# Patient Record
Sex: FEMALE | Race: WHITE | NOT HISPANIC OR LATINO | URBAN - METROPOLITAN AREA
[De-identification: names, ages, dates, MRNs, and addresses within clinical notes are randomized per-mention and may not be internally consistent; named-entity substitution may affect disease eponyms.]

---

## 2017-10-20 ENCOUNTER — ANESTHESIA - HEALTHEAST (OUTPATIENT)
Dept: SURGERY | Facility: CLINIC | Age: 62
End: 2017-10-20

## 2017-10-20 ENCOUNTER — SURGERY - HEALTHEAST (OUTPATIENT)
Dept: SURGERY | Facility: CLINIC | Age: 62
End: 2017-10-20

## 2017-10-20 ASSESSMENT — MIFFLIN-ST. JEOR
SCORE: 1405.44
SCORE: 1369.15

## 2017-10-21 ASSESSMENT — MIFFLIN-ST. JEOR: SCORE: 1393.2

## 2017-10-22 ASSESSMENT — MIFFLIN-ST. JEOR: SCORE: 1392.74

## 2017-10-23 ENCOUNTER — HOME CARE/HOSPICE - HEALTHEAST (OUTPATIENT)
Dept: HOME HEALTH SERVICES | Facility: HOME HEALTH | Age: 62
End: 2017-10-23

## 2017-10-23 ASSESSMENT — MIFFLIN-ST. JEOR: SCORE: 1388.72

## 2021-05-25 ENCOUNTER — RECORDS - HEALTHEAST (OUTPATIENT)
Dept: ADMINISTRATIVE | Facility: CLINIC | Age: 66
End: 2021-05-25

## 2021-05-26 ENCOUNTER — RECORDS - HEALTHEAST (OUTPATIENT)
Dept: ADMINISTRATIVE | Facility: CLINIC | Age: 66
End: 2021-05-26

## 2021-05-31 VITALS — HEIGHT: 64 IN | BODY MASS INDEX: 32.32 KG/M2 | WEIGHT: 189.31 LBS

## 2021-06-01 ENCOUNTER — RECORDS - HEALTHEAST (OUTPATIENT)
Dept: ADMINISTRATIVE | Facility: CLINIC | Age: 66
End: 2021-06-01

## 2021-06-13 NOTE — ANESTHESIA CARE TRANSFER NOTE
Last vitals:   Vitals:    10/20/17 1404   BP: (!) 131/104   Pulse: (!) 108   Resp: 18   Temp: 36.9  C (98.4  F)   SpO2: 99%     Patient's level of consciousness is drowsy  Spontaneous respirations: yes  Maintains airway independently: yes  Dentition unchanged: yes  Oropharynx: oropharynx clear of all foreign objects    QCDR Measures:  ASA# 20 - Surgical Safety Checklist: WHO surgical safety checklist completed prior to induction  PQRS# 430 - Adult PONV Prevention: 4558F - Pt received => 2 anti-emetic agents (different classes) preop & intraop  ASA# 8 - Peds PONV Prevention: NA - Not pediatric patient, not GA or 2 or more risk factors NOT present  PQRS# 424 - Jayashree-op Temp Management: 4559F - At least one body temp DOCUMENTED => 35.5C or 95.9F within required timeframe  PQRS# 426 - PACU Transfer Protocol: - Transfer of care checklist used  ASA# 14 - Acute Post-op Pain: ASA14B - Patient did NOT experience pain >= 7 out of 10

## 2021-06-13 NOTE — ANESTHESIA PREPROCEDURE EVALUATION
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    SK         Neuro/Psych      Endo/Other    (+) obesity,      GI/Hepatic/Renal            Dental - normal exam                        Anesthesia Plan  Planned anesthetic: general endotracheal  ONE AW GLIMPSE WITH GLIDESCOPE, IF NO VIEW, AWAKEN AND INTUBATE  (2/2 NO TIME FOR OBTAINING RECORDS FROM REGIONS--DIFF AW)      Lab Results       Component                Value               Date                       INR                      3.82 (H)            10/20/2017            Lab Results       Component                Value               Date                       HGB                      12.7                10/20/2017            Lab Results       Component                Value               Date                       K                        4.1                 10/20/2017              ASA 4 - emergent   Induction: intravenous   Anesthetic plan and risks discussed with: patient  Anesthesia plan special considerations: video-assisted, rapid sequence induction, antiemetics, IV therapy two IVs,   Post-op plan: routine recovery

## 2021-06-16 PROBLEM — D72.829 LEUKOCYTOSIS, UNSPECIFIED TYPE: Status: ACTIVE | Noted: 2018-06-03

## 2021-06-16 PROBLEM — D62 ACUTE BLOOD LOSS ANEMIA: Status: ACTIVE | Noted: 2018-06-03

## 2021-06-16 PROBLEM — D68.9 COAGULOPATHY (H): Status: ACTIVE | Noted: 2018-06-03

## 2021-06-16 PROBLEM — T79.A22A: Status: ACTIVE | Noted: 2017-10-20

## 2021-06-16 PROBLEM — T14.8XXA HEMATOMA: Status: ACTIVE | Noted: 2018-06-03

## 2021-06-16 PROBLEM — M79.A29 COMPARTMENT SYNDROME, NONTRAUMATIC, LOWER EXTREMITY: Status: ACTIVE | Noted: 2017-10-20
